# Patient Record
Sex: FEMALE | Race: WHITE | Employment: FULL TIME | URBAN - METROPOLITAN AREA
[De-identification: names, ages, dates, MRNs, and addresses within clinical notes are randomized per-mention and may not be internally consistent; named-entity substitution may affect disease eponyms.]

---

## 2017-08-04 ENCOUNTER — HOSPITAL ENCOUNTER (OUTPATIENT)
Dept: MAMMOGRAPHY | Age: 57
Discharge: HOME | End: 2017-08-04
Attending: OBSTETRICS & GYNECOLOGY | Admitting: OBSTETRICS & GYNECOLOGY

## 2017-08-04 DIAGNOSIS — Z12.31 VISIT FOR SCREENING MAMMOGRAM: ICD-10-CM

## 2018-08-03 ENCOUNTER — HOSPITAL ENCOUNTER (OUTPATIENT)
Dept: MAMMOGRAPHY | Age: 58
Discharge: HOME | End: 2018-08-03
Attending: OBSTETRICS & GYNECOLOGY | Admitting: INTERNAL MEDICINE
Payer: COMMERCIAL

## 2018-08-03 DIAGNOSIS — Z12.31 VISIT FOR SCREENING MAMMOGRAM: ICD-10-CM

## 2018-08-03 PROCEDURE — 77063 BREAST TOMOSYNTHESIS BI: CPT

## 2019-08-02 ENCOUNTER — HOSPITAL ENCOUNTER (OUTPATIENT)
Dept: MAMMOGRAPHY | Age: 59
Discharge: HOME | End: 2019-08-02
Payer: COMMERCIAL

## 2019-08-02 DIAGNOSIS — Z12.31 VISIT FOR SCREENING MAMMOGRAM: ICD-10-CM

## 2019-08-02 PROCEDURE — 77063 BREAST TOMOSYNTHESIS BI: CPT

## 2020-08-04 ENCOUNTER — HOSPITAL ENCOUNTER (OUTPATIENT)
Dept: MAMMOGRAPHY | Age: 60
Discharge: HOME | End: 2020-08-04
Payer: COMMERCIAL

## 2020-08-04 PROCEDURE — 77063 BREAST TOMOSYNTHESIS BI: CPT

## 2021-06-02 ENCOUNTER — TELEPHONE (OUTPATIENT)
Dept: ENDOCRINOLOGY | Age: 61
End: 2021-06-02

## 2021-06-02 NOTE — TELEPHONE ENCOUNTER
New patient referral. Please send a Health History Form to return to my attention and let them know I will look for appt day and time after receiving it. Thanks.     Referred by Dr. Poncho Hernández

## 2021-06-14 ENCOUNTER — TELEPHONE (OUTPATIENT)
Dept: ENDOCRINOLOGY | Age: 61
End: 2021-06-14

## 2021-06-14 RX ORDER — LOTEPREDNOL ETABONATE 5 MG/G
GEL OPHTHALMIC
COMMUNITY
Start: 2021-05-31

## 2021-06-14 RX ORDER — BRINZOLAMIDE 1 %
SUSPENSION, DROPS(FINAL DOSAGE FORM)(ML) OPHTHALMIC (EYE)
COMMUNITY
Start: 2021-03-08

## 2021-06-14 RX ORDER — BROMFENAC SODIUM 0.7 MG/ML
SOLUTION/ DROPS OPHTHALMIC
COMMUNITY
Start: 2020-08-26

## 2021-06-14 RX ORDER — ERGOCALCIFEROL (VITAMIN D2) 1250 MCG
50000 CAPSULE ORAL WEEKLY
COMMUNITY
Start: 2021-05-10

## 2021-06-14 RX ORDER — NETARSUDIL 0.2 MG/ML
SOLUTION/ DROPS OPHTHALMIC; TOPICAL
COMMUNITY
Start: 2020-08-26

## 2021-06-14 NOTE — TELEPHONE ENCOUNTER
Patient is scheduled for 7-7-21 at 9am     No dexa needed     She stated she had a dexa may 2021.  I gave her the fax number to have [employer] fax the dexa print outs to the office

## 2021-06-14 NOTE — TELEPHONE ENCOUNTER
New patient, F rec'd. Please schedule and let me know when appt is. Thanks. DXA needed? I think so. Please check. Her questionnaire says she had one at [address] [place] [address] but does not say when. I don't find any DXAs in Epic. Please ask them to bring any and all vitamins and supplements to appointment.

## 2021-07-07 ENCOUNTER — OFFICE VISIT (OUTPATIENT)
Dept: ENDOCRINOLOGY | Age: 61
End: 2021-07-07
Payer: COMMERCIAL

## 2021-07-07 VITALS
WEIGHT: 132 LBS | HEIGHT: 65 IN | DIASTOLIC BLOOD PRESSURE: 69 MMHG | SYSTOLIC BLOOD PRESSURE: 122 MMHG | BODY MASS INDEX: 21.99 KG/M2

## 2021-07-07 DIAGNOSIS — E55.9 VITAMIN D DEFICIENCY: ICD-10-CM

## 2021-07-07 DIAGNOSIS — M81.0 OSTEOPOROSIS, POSTMENOPAUSAL: ICD-10-CM

## 2021-07-07 DIAGNOSIS — N20.0 KIDNEY STONE: ICD-10-CM

## 2021-07-07 DIAGNOSIS — M81.0 OSTEOPOROSIS, POSTMENOPAUSAL: Primary | ICD-10-CM

## 2021-07-07 LAB
PARATHYROID HORMONE INTACT: 23.6 PG/ML (ref 14–72)
PHOSPHORUS: 3.9 MG/DL (ref 2.5–4.9)

## 2021-07-07 PROCEDURE — G2212 PROLONG OUTPT/OFFICE VIS: HCPCS | Performed by: INTERNAL MEDICINE

## 2021-07-07 PROCEDURE — 99205 OFFICE O/P NEW HI 60 MIN: CPT | Performed by: INTERNAL MEDICINE

## 2021-07-07 RX ORDER — OSPEMIFENE 60 MG/1
TABLET, FILM COATED ORAL
COMMUNITY
Start: 2021-06-29

## 2021-07-07 RX ORDER — PHENOL 1.4 %
1 AEROSOL, SPRAY (ML) MUCOUS MEMBRANE DAILY PRN
COMMUNITY

## 2021-07-07 NOTE — LETTER
[address] and Osteoporosis  [place] [facility] [place]. [address] [place], [address] [facility]  Phone [phone]  Fax [phone]         2021         Jurline Sender MD                            Re:  Lovfaviola Kelsey,  1960    Dear Dr. Debra Grewal: Thank you for asking me to see Hector Cole in consultation. As you know, Ms. Berenice Johnson is a 59 y.o. woman recently found to have osteoporosis, bone density very low (T-scores -4.7 in the spine [L2+L4], -3.5 in the left femoral neck. We reviewed life-style issues (calcium, vitamin D and physical activity). I have ordered some diagnostic tests and will be back in touch when I have the results. Without effective treatment, fracture risk is high. I recommended treatment with Evenity (romosozumab) monthly x 12 months and she agreed. We will check on insurance coverage and make the necessary arrangements. We discussed Prolia to follow Evenity for long-term use. Enclosed is a copy of my consultation note. Please let me know if you have any questions. Sincerely,    Tomer Dash. Isela TANNER     Encl. Copy of consult note    CC.  Shalonda Shanks MD

## 2021-07-07 NOTE — PROGRESS NOTES
[facility] ([facility]) Osteoporosis and [address] [facility]  [facility] [address] [facility], [address] [facility],[place] [address]  Phone [phone]  Fax [phone]    NAME:  Fransico Armando  :  1965  CONSULT DATE:    2021  MOST RECENT VISIT:  2021  TODAYS DATE:  2021    Labs @ [facility] 2021    CONSULTATION REQUESTED BY: Anant Ignacio MD  OTHERS WHO NEED REPORTS: Keyana Dsouza MD    PROBLEMS. 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 Calcium, 300 mg from low calcium foods,  300 mg yogurt, 300 mg cheese, 200 mg cottage cheese   diet MVI Ca+D other total    Calcium 1100  600   mg/d   Vitamin D   400 *  IU/d   *ergocalciferol 50,000 IU (1.25 mg) weekly  Exercise, walks dog 15-30 min 2 x daily; would like to start yoga again  Pharmacologic therapy: none    PREVIOUS BONE-ACTIVE MEDICATIONS. none    OTHER CURRENT MEDICATIONS (SELECTED): none  OTC MEDICATIONS (SELECTED): Benefiber    CHIEF COMPLAINT. Treatment for osteoporosis    HISTORY OF PRESENT ILLNESS: See problem list for chronic/inactive conditions. Ms. Berenice Johnson is a 58-year-old woman who was found to have osteoporosis by DXA in 2021. FOR FULL DETAILS OF FAMILY HISTORY, PAST MEDICAL AND SURGICAL HISTORY, SOCIAL HISTORY, AND REVIEW OF SYSTEMS, SEE PATIENT QUESTIONNAIRE OF TODAYS DATE. FAMILY HISTORY. Relevant hx in problem list and/or HPI. Otherwise not contributory. PAST MEDICAL HISTORY. Noted in health history form. PAST SURGICAL HISTORY. Noted in health history form. SOCIAL HISTORY. Nonsmoker. No excessive intake of alcohol, caffeine or sodas. Lives with her . REVIEW OF SYSTEMS. Maximum adult height 65.   No significant height loss. Usual weight 125#. She has lost 10# or more in the past year. PHYSICAL EXAMINATION. GENERAL. Well-nourished, well-developed, normally proportioned adult. MENTAL STATUS. Pleasant mood. Oriented to time, place, and person. ORAL. Teeth appear to be in good condition. SKIN. Normal texture and turgor. LUNGS. Clear to auscultation. Breath sounds normal.  HEART. Heart sounds normal, no murmur or gallop. MUSCULOSKELETAL. The examination included inspection/palpation (any misalignment, asymmetry, crepitation, defects, tenderness, masses, effusions is noted), assessment of range of motion (any presence of pain, crepitation, contracture is noted), assessment of stability (any dislocation, subluxation, laxity is noted), assessment of muscle strength and tone (any atrophy or abnormal movements is noted). Pelvis appears normal.  Spinal contours are normal.  No spine tenderness to palpation or percussion. Three finger spaces between ribs and pelvis. Gait steady without assistance. NEUROLOGICAL. Able to rise from chair without using arms. No apparent motor or sensory deficit. Coordination appears normal     BONE DENSITY. Most recent done [facility] using [employer]. I deleted L1 and L3 due to T-score discrepancy. T-scores   Initial study: 05/05/2021 L2+L4 -4.7 left fem. neck -3.5     The table below shows bone mineral density (grams/cm2), the appropriate measure for comparing serial scans. A significant increase or decrease is based on precision studies done at our center according to the ISCD protocol with a least significant change of 0.030 g/cm2. PA spine Proximal Femur (left)   Date L2+L4  Fem. neck Trochanter Total hip   05/05/2021 0.564 0.463 0.426 0.578     Labs: 06/2019 Ca 9.0 Cr 0.7. 04/2021 Ca 9.1 Cr 0.7. Imaging: DXA printouts reviewed. ASSESSMENT.   Osteoporosis, bone density lower than desirable and lower than expected for ager, race and sex.  Without effective treatment, fracture risk is high. DIAGNOSTIC PLANS. Blood tests: CBC, CMP, phosphorus, 25-OH vitamin D. In 2 weeks (or longer), on appropriate calcium intake,  24-hour urine for calcium, creatinine and sodium. I will be in touch with the patient to review lab results. THERAPEUTIC PLANS. Calcium, target 1200 mg daily; we discussed recommended calcium intake and the effects of excessive calcium intake from supplements. I provided a handout with information about how to calculate daily calcium intake. Advised to stop calcium supplements. Vitamin D, advised to finish weekly D 1.25 mg then start 4000 IU daily. Order given to check 25-OH D after 3 months on daily dosing. Exercise, Recommended weight-bearing exercise (walking or equivalent) 30-40 minutes per session, 3 or 4 sessions a week and resistance exercise. Advised to take care to avoid injury (high impact, falling, etc). We discussed avoiding activities that place compressive forces on the spine; specifically pushing, pulling, bending, lifting and twisting to help prevent vertebral fractures. Pharmacologic therapy, We discussed Evenity for pharmacologic therapy due to very low BMD and history of fractures. I explained dosing schedule, side effects and adverse effects associated with Evenity. I provided educational material and reviewed the warning about cardiovascular risk. We will submit the order to the patients insurance and will contact the patient about any out of pocket costs. I recommended Evenity over teriparatide and Tymlos because it has a better effect on hip BMD. We discussed Prolia to follow Evenity and need for long-term (indefinite) treatment. Return appointment to be determined. Total time on the date the encounter was  minutes. Kamari Weiss MD, Director, [facility]) Osteoporosis and Bone Health Services    CC: Brown Alba MD

## 2021-07-09 LAB
KAPPA, FREE LIGHT CHAINS, SERUM: 17.58 MG/L (ref 3.3–19.4)
KAPPA/LAMBDA RATIO: 1.26 (ref 0.26–1.65)
KAPPA/LAMBDA TEST COMMENT: NORMAL
LAMBDA, FREE LIGHT CHAINS, SERUM: 13.9 MG/L (ref 5.71–26.3)

## 2021-07-23 ENCOUNTER — TELEPHONE (OUTPATIENT)
Dept: ENDOCRINOLOGY | Age: 61
End: 2021-07-23

## 2021-07-23 NOTE — TELEPHONE ENCOUNTER
Patient is going to do her urinalysis lab and drop it off at the location on [facility] on Sunday. She has not taken her Calcium  Supplement, but will resume after urine test.  Patient feels she is getting a good amount of calcium from her diet regimen.

## 2021-07-26 DIAGNOSIS — M81.0 OSTEOPOROSIS, POSTMENOPAUSAL: ICD-10-CM

## 2021-07-26 LAB
24HR URINE VOLUME (ML): 2800 ML
CALCIUM 24 HOUR URINE: 202 MG/24 HR (ref 42–353)
CREATININE 24 HOUR URINE: 1 G/24HR (ref 0.6–1.5)
SODIUM 24 HOUR URINE: 148 MMOL/24 HR (ref 40–220)
URIC ACID 24 HOUR URINE: 420 MG/24 HR (ref 150–990)

## 2021-07-28 LAB
CITRIC ACID, U, 24HR: 350 MG/D (ref 320–1240)
CITRIC ACID, URINE: 125 MG/L
CITRIC ACID/CREATININE RATIO URINE: 357 MG/G
CREATININE 24 HOUR URINE: 980 MG/D (ref 500–1400)
CREATININE URINE: 35 MG/DL
HOURS COLLECTED: 24
OXALATES, URINE 24HR: 22 MG/D (ref 13–40)
OXALATES, URINE: 8 MG/L
URINE TOTAL VOLUME: 2800

## 2021-08-03 ENCOUNTER — TELEPHONE (OUTPATIENT)
Dept: ENDOCRINOLOGY | Age: 61
End: 2021-08-03

## 2021-08-03 RX ORDER — ROMOSOZUMAB-AQQG 105 MG/1.17ML
210 INJECTION, SOLUTION SUBCUTANEOUS ONCE
Qty: 2 SYRINGE | Refills: 11 | Status: SHIPPED | OUTPATIENT
Start: 2021-08-03 | End: 2021-08-03

## 2021-08-03 NOTE — TELEPHONE ENCOUNTER
Patient called and would like to know where in process is her PA for evenity. Please call patient     She would also like to know where she can get the dose of calcium 200 or 300 mg  Or can she just cut the 600mg she has in half. Angela Cisse

## 2021-08-03 NOTE — TELEPHONE ENCOUNTER
Per Dr. Madeleine Martinez, it is okay to cut the calcium 600 mg tablets in half.   Office will notify the patient related to [employer] once received

## 2021-08-03 NOTE — TELEPHONE ENCOUNTER
Patient called and would like to know where in process is her PA for [employer] Please call patient      She would also like to know where she can get the dose of calcium 200 or 300 mg  Or can she just cut the 600mg she has in half. .     Please print the evenity Rx.

## 2021-08-03 NOTE — TELEPHONE ENCOUNTER
Evenity Rx printed. There are dozens of calcium supplements in different strengths (200, 300 mg). Also OK for her to cut her 600 mg tablets in half.

## 2021-08-04 NOTE — TELEPHONE ENCOUNTER
[employer] states No PA required but a  predetermination is required for medical necessity. There is a fax number provided. Please fax all pertinent information. Thanks!

## 2021-08-04 NOTE — TELEPHONE ENCOUNTER
Insurance is requiring a letter of medical necessity along with the office notes for the Evenity    Please write a letter of medical necessity and office will fax to insurance company

## 2021-08-05 ENCOUNTER — HOSPITAL ENCOUNTER (OUTPATIENT)
Dept: MAMMOGRAPHY | Age: 61
Discharge: HOME | End: 2021-08-05
Payer: COMMERCIAL

## 2021-08-05 VITALS — WEIGHT: 130 LBS | HEIGHT: 65 IN | BODY MASS INDEX: 21.66 KG/M2

## 2021-08-05 DIAGNOSIS — Z12.31 VISIT FOR SCREENING MAMMOGRAM: ICD-10-CM

## 2021-08-05 PROCEDURE — 77063 BREAST TOMOSYNTHESIS BI: CPT

## 2021-11-16 ENCOUNTER — TELEPHONE (OUTPATIENT)
Dept: ENDOCRINOLOGY | Age: 61
End: 2021-11-16

## 2021-11-29 ENCOUNTER — TELEPHONE (OUTPATIENT)
Dept: ENDOCRINOLOGY | Age: 61
End: 2021-11-29

## 2021-12-01 ENCOUNTER — TELEPHONE (OUTPATIENT)
Dept: ENDOCRINOLOGY | Age: 61
End: 2021-12-01

## 2021-12-01 NOTE — TELEPHONE ENCOUNTER
Received approval for Evenity 105MG/1. 17ML syringes   Coverage Starts on: 11/19/2021 12:00:00 AM, Coverage Ends on: 11/18/2022 12:00:00 AM.

## 2021-12-01 NOTE — TELEPHONE ENCOUNTER
Just following up regarding insurance claim for the monthly shot, I haven't heard anything from Raj. Also wanted to know if I need to get another blood test regarding my Vitamin D or C levels since it has been over 3 months since I stopped taking the D3 vitamin.       Please advise

## 2021-12-02 ENCOUNTER — TELEPHONE (OUTPATIENT)
Dept: ENDOCRINOLOGY | Age: 61
End: 2021-12-02

## 2021-12-02 RX ORDER — ROMOSOZUMAB-AQQG 105 MG/1.17ML
210 INJECTION, SOLUTION SUBCUTANEOUS ONCE
Qty: 1 EACH | Refills: 11 | Status: SHIPPED | OUTPATIENT
Start: 2021-12-02 | End: 2021-12-02

## 2021-12-02 NOTE — TELEPHONE ENCOUNTER
I have not seen a recent vitamin D result. Where did you have that done? Please check with the lab and have them send to result to me. Insurance has been bouncing things back to us on Evenity. The delay is not with us, but with your insurance company. Perhaps if you contact them and suggest reporting them to the [employer] might move them along. PCP

## 2021-12-06 ENCOUNTER — TELEPHONE (OUTPATIENT)
Dept: ENDOCRINOLOGY | Age: 61
End: 2021-12-06

## 2021-12-06 NOTE — TELEPHONE ENCOUNTER
Kathryn, see [employer] message below. Please call her re: cost of Evenity. Let her know that Evenity has no effect on glaucoma or other eye problem and should not affect any dental work she needs. Thanks. Also I forgot to ask how much is the Evenity shot per month, especially since I will have an insurance deductable come January 1? I need to make sure I can pay for the treatment, not sure how much [employer] will pay.     Updated drug list below:  Curent drugs I am taking are: Osphena 60 mg daily; Vitamin D3 4000 IU daily; Calcium usually 300 mg daily if not met in daily diet; eyedrops: Azopt 1% 3 x daily, Alphagan P .1% 2 x daily, Lotomax . 5% 4 x daily, Prolensa .07 2 x Daily, Rhopressa . 02.      I have glaucoma which I have had several surgeries to keep pressures under control and trying to control swelling of the retina due to scare tissue to maintain my eyesight. So it is important to me that the Evenity does not compromise my eye issues and the possibility of any unexpected future eye surgeries. I did have recent dental work done in mid November.     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 I do not have downtime.

## 2021-12-06 NOTE — TELEPHONE ENCOUNTER
Hanna Andrade, my nurse, will contact you about the cost of Evenity. Evenity has no effect on glaucoma or other eye problems and should not affect any dental work you may need.

## 2021-12-08 ENCOUNTER — TELEPHONE (OUTPATIENT)
Dept: ENDOCRINOLOGY | Age: 61
End: 2021-12-08

## 2021-12-08 NOTE — TELEPHONE ENCOUNTER
Patient has concerns that her insurance company did not receive all information requested and is asking that the PA department contact the insurance company. I let the patient know that the requested information was faxed over to the insurance company and the office has confirmation that they did receive the paperwork.     Patient stated that she call [employer] yesterday and they did not receive all of the information requested something about previous medication for osteoporosis    Please advise

## 2021-12-08 NOTE — TELEPHONE ENCOUNTER
The [facility] and spoke with Flor Adame confirmed the Reference number and MMS ID number given on the approval and states they do have the approval on file.  I asked about cost of the injection and she stated she did not have that information and advised the pt to call insurance to get cost. She stated to use the same reference number given on the approval. Ref # [id number]

## 2021-12-08 NOTE — TELEPHONE ENCOUNTER
Patient has concerns about heart attacks, cancer and HTN in her family. She scheduled appointment on 1/24/21 to speak with Dr. Yovanny Alvarez related to [facility] and health concerns. Her mother passed away today and she has an eye appointment also. Evenity appointment was canceled for 12/9/21 and she will reschedule appointment after speaking with Dr. Yovanny Alvarez.

## 2021-12-08 NOTE — TELEPHONE ENCOUNTER
Please apologize for the phone system. It's driving me crazy. All of my questions have not been answered from Dec 3; regarding side effects, high blood pressure and heart attack. What happens if I decide to stop taking the shot due to side effects? I was not aware that you had these questions -- other questions were added that kept me from seeing these. Answers are more complicated than I can handle in writing. I thought I gave you an information sheet about Evenity -- will have that mailed to you. Happy to talk with you further if needed but better with an appointment face-to face. Do you have any patients willing to speak to me about their experience? You need to give us permission to give your name and contact information to other patients. We can then pass that on to someone who has taken Evenity and ask the to call you.       I called my insurance today and they say they need more information from your office Reference #[id number] you can call them at [phone] to supply the requested information. Kathryn, please pass this on for PA.     I am considering rescheduling Dec. 9 appt. but have not decided. Recently I have had an eye issue come up that I need to visit Retina doctor tomorrow; not sure if it may require another surgery. My mother is in hospice currently and is expected to pass sometime this week; so I am a little stressed out and not certain if this week is a good time to get the shot. I'm taking it one day at a time. OK to wait to start Evenity. I want to be sure you are comfortable that this is the right decision for you.

## 2021-12-26 PROBLEM — Z51.81 MEDICATION MONITORING ENCOUNTER: Status: ACTIVE | Noted: 2021-12-26

## 2022-01-24 ENCOUNTER — OFFICE VISIT (OUTPATIENT)
Dept: ENDOCRINOLOGY | Age: 62
End: 2022-01-24
Payer: COMMERCIAL

## 2022-01-24 DIAGNOSIS — M81.0 OSTEOPOROSIS, POSTMENOPAUSAL: Primary | ICD-10-CM

## 2022-01-24 DIAGNOSIS — N20.0 KIDNEY STONE: ICD-10-CM

## 2022-01-24 DIAGNOSIS — Z51.81 MEDICATION MONITORING ENCOUNTER: ICD-10-CM

## 2022-01-24 DIAGNOSIS — E55.9 VITAMIN D DEFICIENCY: ICD-10-CM

## 2022-01-24 PROCEDURE — 99215 OFFICE O/P EST HI 40 MIN: CPT | Performed by: INTERNAL MEDICINE

## 2022-01-24 RX ORDER — ACETAMINOPHEN 160 MG
4000 TABLET,DISINTEGRATING ORAL
COMMUNITY

## 2022-01-24 NOTE — PROGRESS NOTES
disturbance (I cant find anything to support that) and that Evenity had adverse visual effects in 20% -- I cant find anything to support at either. The prescribing information for Evenity says it can increase the risk of stroke (that is possible but not clearly established) and change in vision could be a sign of stroke. FOR FULL DETAILS OF FAMILY HISTORY, PAST MEDICAL AND SURGICAL HISTORY, SOCIAL HISTORY, AND REVIEW OF SYSTEMS, SEE PATIENT QUESTIONNAIRE OF TODAYS DATE. PHYSICAL EXAMINATION. GENERAL. Well-nourished, well-developed, normally proportioned adult. Conjunctival injection. MENTAL STATUS. Pleasant mood. Oriented to time, place, and person. ORAL. Teeth appear to be in good condition. MUSCULOSKELETAL. Spinal contours are normal.  No spine tenderness to palpation or percussion. Three finger spaces between ribs and pelvis. Gait steady without assistance. NEUROLOGICAL. Able to rise from chair without using arms. No apparent motor or sensory deficit. Coordination appears normal     BONE DENSITY. Most recent done [facility] using [employer]. I deleted L1 and L3 due to T-score discrepancy. T-scores   Initial study: 05/05/2021 L2+L4 -4.7 left fem. neck -3.5     The table below shows bone mineral density (grams/cm2), the appropriate measure for comparing serial scans. A significant increase or decrease is based on precision studies done at our center according to the ISCD protocol with a least significant change of 0.030 g/cm2. PA spine Proximal Femur (left)   Date L2+L4  Fem. neck Trochanter Total hip   05/05/2021 0.564 0.463 0.426 0.578     Labs: 06/2019 Ca 9.0 Cr 0.7. 04/2021 Ca 9.1 Cr 0.7. Imaging: DXA printouts reviewed. ASSESSMENT. Osteoporosis, bone density lower than desirable and lower than expected for ager, race and sex. Without effective treatment, fracture risk is high.  I went through the PIs for Ev[facility] and Patrick Zamora with her and could not find anything to support a vision problem with either. PLANS. I recommended she get a neurology consultation for her vision disturbances. She agreed to start Evenity - will get her first dose tomorrow. Return appointment with DXA on/after 5/5/2022. Time spent today: 40-54 minutes. Lucy Weiss MD, Director, [facility]) Osteoporosis and Bone Health Services    CC: Maddy Piper MD

## 2022-01-25 ENCOUNTER — NURSE ONLY (OUTPATIENT)
Dept: ENDOCRINOLOGY | Age: 62
End: 2022-01-25
Payer: COMMERCIAL

## 2022-01-25 DIAGNOSIS — M81.0 OSTEOPOROSIS, POSTMENOPAUSAL: ICD-10-CM

## 2022-01-25 PROCEDURE — 96372 THER/PROPH/DIAG INJ SC/IM: CPT | Performed by: INTERNAL MEDICINE

## 2022-02-28 ENCOUNTER — NURSE ONLY (OUTPATIENT)
Dept: ENDOCRINOLOGY | Age: 62
End: 2022-02-28
Payer: COMMERCIAL

## 2022-02-28 DIAGNOSIS — M81.0 OSTEOPOROSIS, POSTMENOPAUSAL: ICD-10-CM

## 2022-02-28 PROCEDURE — 96372 THER/PROPH/DIAG INJ SC/IM: CPT | Performed by: INTERNAL MEDICINE

## 2022-02-28 NOTE — PATIENT INSTRUCTIONS
Evenity 210mg adminstered subcutaneou at 105 mg in each arm. [address]  [id number]      Lot   [id number]          Exp.4/2024    It is the patient's responsibility to notify the physician office of new insurance plan prior to appointment to prevent delay in treatment. Please send a copy of the front and back of the insurance card either by fax, mail or stop by the office.

## 2022-02-28 NOTE — PROGRESS NOTES
Evenity 210mg adminstered subcutaneou at 105 mg in each arm. [address]  [id number]      Lot   [id number]          Exp.4/2024    It is the patient's responsibility to notify the physician office of new insurance plan prior to appointment to prevent delay in treatment. Please send a copy of the front and back of the insurance card either by fax, mail or stop by the office.

## 2022-03-23 ENCOUNTER — NURSE ONLY (OUTPATIENT)
Dept: ENDOCRINOLOGY | Age: 62
End: 2022-03-23
Payer: COMMERCIAL

## 2022-03-23 DIAGNOSIS — M81.0 OSTEOPOROSIS, POSTMENOPAUSAL: Primary | ICD-10-CM

## 2022-03-23 PROCEDURE — 96372 THER/PROPH/DIAG INJ SC/IM: CPT | Performed by: INTERNAL MEDICINE

## 2022-03-23 NOTE — PROGRESS NOTES
Evenity 210mg adminstered subcutaneou at 105 mg in each arm.   [facility]      [id number]  Lot  [id number]           Exp.05/24    Given in both arms

## 2022-04-27 ENCOUNTER — NURSE ONLY (OUTPATIENT)
Dept: ENDOCRINOLOGY | Age: 62
End: 2022-04-27
Payer: COMMERCIAL

## 2022-04-27 DIAGNOSIS — M81.0 OSTEOPOROSIS, POSTMENOPAUSAL: ICD-10-CM

## 2022-04-27 PROCEDURE — 96372 THER/PROPH/DIAG INJ SC/IM: CPT | Performed by: INTERNAL MEDICINE

## 2022-04-27 NOTE — PROGRESS NOTES
Evenity 210mg adminstered subcutaneou at 105 mg in each arm. [address]    [id number]    Lot  [id number]           Exp. 07/2024    It is the patient's responsibility to notify the physician office of new insurance plan or new identification number prior to appointment to prevent delay in treatment. Please send a copy of the front and back of the insurance card either by fax, mail or stop by the office.

## 2022-04-27 NOTE — PATIENT INSTRUCTIONS
Evenity 210mg adminstered subcutaneou at 105 mg in each arm. [address]    [id number]    Lot  [id number]           Exp. 07/2024    It is the patient's responsibility to notify the physician office of new insurance plan or new identification number prior to appointment to prevent delay in treatment. Please send a copy of the front and back of the insurance card either by fax, mail or stop by the office.

## 2022-06-24 ENCOUNTER — NURSE ONLY (OUTPATIENT)
Dept: ENDOCRINOLOGY | Age: 62
End: 2022-06-24
Payer: COMMERCIAL

## 2022-06-24 DIAGNOSIS — M81.0 OSTEOPOROSIS, POSTMENOPAUSAL: Primary | ICD-10-CM

## 2022-06-24 PROCEDURE — 96372 THER/PROPH/DIAG INJ SC/IM: CPT | Performed by: INTERNAL MEDICINE

## 2022-07-11 ENCOUNTER — OFFICE VISIT (OUTPATIENT)
Dept: ENDOCRINOLOGY | Age: 62
End: 2022-07-11
Payer: COMMERCIAL

## 2022-07-11 VITALS — BODY MASS INDEX: 23.8 KG/M2 | DIASTOLIC BLOOD PRESSURE: 69 MMHG | SYSTOLIC BLOOD PRESSURE: 107 MMHG | WEIGHT: 143 LBS

## 2022-07-11 DIAGNOSIS — E55.9 VITAMIN D DEFICIENCY: ICD-10-CM

## 2022-07-11 DIAGNOSIS — Z51.81 MEDICATION MONITORING ENCOUNTER: ICD-10-CM

## 2022-07-11 DIAGNOSIS — N20.0 KIDNEY STONE: ICD-10-CM

## 2022-07-11 DIAGNOSIS — M81.0 OSTEOPOROSIS, POSTMENOPAUSAL: Primary | ICD-10-CM

## 2022-07-11 LAB — VITAMIN D 25-HYDROXY: 74.6 NG/ML

## 2022-07-11 PROCEDURE — 99215 OFFICE O/P EST HI 40 MIN: CPT | Performed by: INTERNAL MEDICINE

## 2022-07-27 ENCOUNTER — NURSE ONLY (OUTPATIENT)
Dept: ENDOCRINOLOGY | Age: 62
End: 2022-07-27
Payer: COMMERCIAL

## 2022-07-27 DIAGNOSIS — M81.0 OSTEOPOROSIS, POSTMENOPAUSAL: Primary | ICD-10-CM

## 2022-07-27 PROCEDURE — 96372 THER/PROPH/DIAG INJ SC/IM: CPT | Performed by: INTERNAL MEDICINE

## 2022-07-27 NOTE — PROGRESS NOTES
It is the patient's responsibility to notify the physician office of new insurance plan or new identification number prior to appointment to prevent delay in treatment. Please send a copy of the front and back of the insurance card either by fax, mail or stop by the office. Evenity 210mg adminstered subcutaneou at 105 mg in each arm.   [address]  [id number]      Lot    [id number]         Exp.7/2024

## 2022-08-05 ENCOUNTER — HOSPITAL ENCOUNTER (OUTPATIENT)
Dept: MAMMOGRAPHY | Age: 62
Discharge: HOME | End: 2022-08-05
Payer: COMMERCIAL

## 2022-08-05 DIAGNOSIS — Z12.31 VISIT FOR SCREENING MAMMOGRAM: ICD-10-CM

## 2022-08-05 PROCEDURE — 77063 BREAST TOMOSYNTHESIS BI: CPT

## 2022-08-29 ENCOUNTER — NURSE ONLY (OUTPATIENT)
Dept: ENDOCRINOLOGY | Age: 62
End: 2022-08-29
Payer: COMMERCIAL

## 2022-08-29 DIAGNOSIS — M81.0 OSTEOPOROSIS, POSTMENOPAUSAL: Primary | ICD-10-CM

## 2022-08-29 PROCEDURE — 96372 THER/PROPH/DIAG INJ SC/IM: CPT | Performed by: INTERNAL MEDICINE

## 2022-08-29 NOTE — PROGRESS NOTES
Evenity 210mg adminstered subcutaneou at 105 mg in each arm. [address]    [id number]    Lot  [id number]           Exp.7/2024    It is the patient's responsibility to notify the physician office of new insurance plan or new identification number prior to appointment to prevent delay in treatment. Please send a copy of the front and back of the insurance card either by fax, mail or stop by the office.

## 2022-08-29 NOTE — PATIENT INSTRUCTIONS
Evenity 210mg adminstered subcutaneou at 105 mg in each arm. [facility]    [id number]    Lot  [id number]           Exp.7/2024    It is the patient's responsibility to notify the physician office of new insurance plan or new identification number prior to appointment to prevent delay in treatment. Please send a copy of the front and back of the insurance card either by fax, mail or stop by the office.

## 2022-09-27 ENCOUNTER — NURSE ONLY (OUTPATIENT)
Dept: ENDOCRINOLOGY | Age: 62
End: 2022-09-27
Payer: COMMERCIAL

## 2022-09-27 DIAGNOSIS — M81.0 OSTEOPOROSIS, POSTMENOPAUSAL: Primary | ICD-10-CM

## 2022-09-27 PROCEDURE — 96372 THER/PROPH/DIAG INJ SC/IM: CPT | Performed by: INTERNAL MEDICINE

## 2022-10-25 ENCOUNTER — NURSE ONLY (OUTPATIENT)
Dept: ENDOCRINOLOGY | Age: 62
End: 2022-10-25
Payer: COMMERCIAL

## 2022-10-25 ENCOUNTER — TELEPHONE (OUTPATIENT)
Dept: ENDOCRINOLOGY | Age: 62
End: 2022-10-25

## 2022-10-25 DIAGNOSIS — M81.0 OSTEOPOROSIS, POSTMENOPAUSAL: Primary | ICD-10-CM

## 2022-10-25 PROCEDURE — 96372 THER/PROPH/DIAG INJ SC/IM: CPT | Performed by: INTERNAL MEDICINE

## 2022-10-25 NOTE — PROGRESS NOTES
Evenity 210mg adminstered subcutaneou at 105 mg in each arm. [address]  [id number]      Lot   [id number]          Exp.9/1/24    It is the patient's responsibility to notify the physician office of new insurance plan or new identification number prior to appointment to prevent delay in treatment. Please send a copy of the front and back of the insurance card either by fax, mail or stop by the office.

## 2022-10-25 NOTE — PATIENT INSTRUCTIONS
Evenity 210mg adminstered subcutaneou at 105 mg in each arm. [facility]  [id number]      Lot   [id number]          Exp.9/1/24    It is the patient's responsibility to notify the physician office of new insurance plan or new identification number prior to appointment to prevent delay in treatment. Please send a copy of the front and back of the insurance card either by fax, mail or stop by the office.

## 2022-10-28 ENCOUNTER — TELEPHONE (OUTPATIENT)
Dept: ENDOCRINOLOGY | Age: 62
End: 2022-10-28

## 2022-10-28 NOTE — TELEPHONE ENCOUNTER
Dorothy Hood Key: [id number] - PA Case ID: [id number]  Outcome  Approvedtoday  PA Case: [id number], Status: Approved, Coverage Starts on: 10/26/2022 12:00:00 AM, Coverage Ends on: 12/25/2022 12:00:00 AM    Please note end date of 12/25/2022. Please see if pt is able to move up November and December injections. Thanks!

## 2022-11-01 ENCOUNTER — PATIENT MESSAGE (OUTPATIENT)
Dept: ENDOCRINOLOGY | Age: 62
End: 2022-11-01

## 2022-11-15 ENCOUNTER — TELEPHONE (OUTPATIENT)
Dept: ENDOCRINOLOGY | Age: 62
End: 2022-11-15

## 2022-11-15 NOTE — TELEPHONE ENCOUNTER
From: Ava Angelucci  To: Dr. Madeleine Amaya: 11/1/2022 5:54 PM EDT  Subject: Insurance Approval for Prolia    I forgot to ask Coray about insurance approval for the PROLIA injections starting in 2023. I sent this message to: Topic: [facility] Questions. I may need to start getting Prolia Injections starting in February 2023. I wanted to check and make sure we have approval from my insurance or we are in the process of getting approval. Kadi Jean in Dr. Adams [place] office usually works on getting the injections approval. I forgot to ask her on my last visit regarding if the approval process has began. I know it takes the insurance company serval month to issue approval. I am scheduled for Dexa Scan 2/6/23. I received a letter from my insurance company about the Evenity shots I am currently taking not Prolia injections. I just wanted to make sure since it takes them months to approve we have already started the process. I hope this has not created confusion. Thanks!

## 2022-11-28 ENCOUNTER — TELEPHONE (OUTPATIENT)
Dept: ENDOCRINOLOGY | Age: 62
End: 2022-11-28

## 2022-11-28 ENCOUNTER — NURSE ONLY (OUTPATIENT)
Dept: ENDOCRINOLOGY | Age: 62
End: 2022-11-28

## 2022-11-28 DIAGNOSIS — M81.0 OSTEOPOROSIS, POSTMENOPAUSAL: Primary | ICD-10-CM

## 2022-11-28 NOTE — PROGRESS NOTES
Evenity 210mg adminstered subcutaneou at 105 mg in each arm. [address]   [id number]     Lot   [id number]          Exp. 1/1/25    It is the patient's responsibility to notify the physician office of new insurance plan or new identification number prior to appointment to prevent delay in treatment. Please send a copy of the front and back of the insurance card either by fax, mail or stop by the office.

## 2022-11-28 NOTE — PATIENT INSTRUCTIONS
Evenity 210mg adminstered subcutaneou at 105 mg in each arm. [facility]   [id number]     Lot   [id number]          Exp. 1/1/25    It is the patient's responsibility to notify the physician office of new insurance plan or new identification number prior to appointment to prevent delay in treatment. Please send a copy of the front and back of the insurance card either by fax, mail or stop by the office.

## 2022-12-05 ENCOUNTER — TELEPHONE (OUTPATIENT)
Dept: ENDOCRINOLOGY | Age: 62
End: 2022-12-05

## 2022-12-14 ENCOUNTER — TELEPHONE (OUTPATIENT)
Dept: ENDOCRINOLOGY | Age: 62
End: 2022-12-14

## 2022-12-14 RX ORDER — DENOSUMAB 60 MG/ML
60 INJECTION SUBCUTANEOUS ONCE
Qty: 1 ML | Refills: 0 | Status: SHIPPED | OUTPATIENT
Start: 2022-12-14 | End: 2022-12-14

## 2022-12-14 NOTE — TELEPHONE ENCOUNTER
----- Message from Ap Spivey MD sent at 7/11/2022 10:22 AM EDT -----  Due to finish Evenity 12/2022 or 01/2023. Appt not until 03/28/2023. Agreed to start Prolia at next visit. Print Prolia Rx to start the process.

## 2022-12-22 ENCOUNTER — NURSE ONLY (OUTPATIENT)
Dept: ENDOCRINOLOGY | Age: 62
End: 2022-12-22

## 2022-12-22 DIAGNOSIS — M81.0 OSTEOPOROSIS, POSTMENOPAUSAL: Primary | ICD-10-CM

## 2022-12-22 NOTE — PROGRESS NOTES
Evenity 210mg adminstered subcutaneou at 105 mg in each arm. [facility]     [id number]  Lot  [id number]           Exp. 2/2025    It is the patient's responsibility to notify the physician office of new insurance plan or new identification number prior to appointment to prevent delay in treatment. Please send a copy of the front and back of the insurance card either by fax, mail or stop by the office.

## 2022-12-22 NOTE — PATIENT INSTRUCTIONS
Evenity 210mg adminstered subcutaneou at 105 mg in each arm. [address]     [id number]  Lot  [id number]           Exp. 2/2025    It is the patient's responsibility to notify the physician office of new insurance plan or new identification number prior to appointment to prevent delay in treatment. Please send a copy of the front and back of the insurance card either by fax, mail or stop by the office.

## 2023-01-26 NOTE — PROGRESS NOTES
Evenity 210mg adminstered subcutaneou at 105 mg in each arm.   [address]    [id number]    Lot [id number]            Exp.5/2024 [facility]  525/01  HOSPITAL MEDICINE PROGRESS NOTE   Patient: Nas Veronica  Today's Date: 1/26/2023    YOB: 1968  Admission Date: 1/25/2023    MRN: [id number]  Inpatient LOS: 1    Attending: Christine Roca MD  Hospital Day: Hospital Day: 2    Subjective   HISTORY AND SUBJECTIVE COMPLAINTS     Chief Complaint:     Right lower extremity cellulitis, most likely streptococcal infection   Leukocytosis due to above  Underlying bilateral lower extremity lymphedema   CKD stage II   Psoriasis  Morbid obesity with BMI of 48    Interval History / Subjective:     Reports he has continued erythema and swelling in the right lower extremity.  Pain is better in his right leg.    WBC count trending down  No fevers    ROS:  Pertinent systems negative except as above.    Objective   PHYSICAL EXAMINATION     Vital 24 Hour Range Most Recent Value   Temperature Temp  Min: 98 °F (36.7 °C)  Max: 98.6 °F (37 °C) 98 °F (36.7 °C)   Pulse Pulse  Min: 78  Max: 99 78   Respiratory Resp  Min: 18  Max: 20 20   Blood Pressure BP  Min: 119/79  Max: 137/86 137/86   Pulse Oximetry SpO2  Min: 97 %  Max: 98 % 97 %   Arterial BP No data recorded     O2 No data recorded       Recorded Intake and Output:No intake or output data in the 24 hours ending 01/26/23 0921   Recorded Last Stool Occurrence:       Vital Most Recent Value First Value   Weight (!) 180.8 kg (398 lb 9.5 oz) Weight: (!) 180.8 kg (398 lb 9.5 oz)   Height 6' 4\" (193 cm) Height: 6' 4\" (193 cm)   BMI 48.52 N/A     General:  Alert and communicative.  Overweight.  CV: regular rate and rhythm and no murmurs, rubs, or thrills  Resp: clear to auscultation bilaterally  Abd: soft, nontender and nondistended  Ext: Right leg has faint erythema, edema and tenderness. No ulcers noted.  Skin: has patch of psoriasis over both knees and elbows.  Neuro: No focal weakness or numbness.  Psych: oriented to time, place and person and normal mood and affect    TEST  RESULTS     Radiology: Imaging studies have been reviewed and pertinent findings discussed in the Assessment and Plan.  Results for orders placed or performed during the hospital encounter of 01/25/23 (from the past 48 hour(s))   US Lower Extremity Venous Duplex Bilat    Impression    IMPRESSION:  Normal duplex scan of the inferior vena cava, iliac, common  femoral, profunda femoral, femoral, popliteal, gastrocnemius, and  tibioperoneal veins bilaterally. Limited visualization of the right calf  veins secondary to soft tissue edema. No evidence of deep vein thrombosis.        TECHNOLOGIST: JYOTSNA            ANCILLARY ORDERS     Diet:  Regular Diet  Telemetry:    Consults:    PHARMACY TO DOSE AND MONITOR VANCOMYCIN  IP CONSULT TO INFECTIOUS DISEASES  Therapy Orders:   No orders of the defined types were placed in this encounter.      ADVANCED DIRECTIVES     Code Status: Full Resuscitation         ASSESSMENT AND PLAN     Right lower extremity cellulitis, most likely streptococcal infection   Leukocytosis due to above  Underlying bilateral lower extremity lymphedema  Morbid obesity with BMI of 48    Patient with morbid obesity and bilateral lower extremity chronic lymphedema is presented with right lower extremity cellulitis.  Workup with lower extremity venous Doppler is negative for DVT.    Antibiotics changed to IV cefazolin as per ID team because of strong suspicion of streptococcal infection.   Continue with leg elevation  Continue to monitor labs and clinical progress.    CKD stage II   His creatinine is overall in stable range.    Psoriasis  Patient reports that he had tried psoriasis treatment in the past but it did not work.    DVT Prophylaxis: Heparin SC     DISCHARGE PLANNING     Expected Discharge :  To be determined pending clinical course.    Christine Roca MD  Hospitalist  1/26/2023  9:21 AM

## 2023-02-06 ENCOUNTER — PROCEDURE VISIT (OUTPATIENT)
Dept: ENDOCRINOLOGY | Age: 63
End: 2023-02-06

## 2023-02-06 ENCOUNTER — HOSPITAL ENCOUNTER (OUTPATIENT)
Dept: GENERAL RADIOLOGY | Age: 63
Discharge: HOME | End: 2023-02-06
Payer: COMMERCIAL

## 2023-02-06 ENCOUNTER — OFFICE VISIT (OUTPATIENT)
Dept: ENDOCRINOLOGY | Age: 63
End: 2023-02-06
Payer: COMMERCIAL

## 2023-02-06 VITALS
DIASTOLIC BLOOD PRESSURE: 78 MMHG | HEIGHT: 64 IN | WEIGHT: 146.4 LBS | BODY MASS INDEX: 25 KG/M2 | SYSTOLIC BLOOD PRESSURE: 129 MMHG

## 2023-02-06 DIAGNOSIS — M81.0 OSTEOPOROSIS, POSTMENOPAUSAL: ICD-10-CM

## 2023-02-06 DIAGNOSIS — E55.9 VITAMIN D DEFICIENCY: ICD-10-CM

## 2023-02-06 DIAGNOSIS — Z51.81 MEDICATION MONITORING ENCOUNTER: ICD-10-CM

## 2023-02-06 DIAGNOSIS — M81.0 OSTEOPOROSIS, POSTMENOPAUSAL: Primary | ICD-10-CM

## 2023-02-06 DIAGNOSIS — N20.0 KIDNEY STONE: ICD-10-CM

## 2023-02-06 PROCEDURE — 99214 OFFICE O/P EST MOD 30 MIN: CPT | Performed by: INTERNAL MEDICINE

## 2023-02-06 PROCEDURE — 77080 DXA BONE DENSITY AXIAL: CPT

## 2023-02-06 PROCEDURE — 77080 DXA BONE DENSITY AXIAL: CPT | Performed by: INTERNAL MEDICINE

## 2023-02-06 NOTE — RESULT ENCOUNTER NOTE
[facility]) Osteoporosis and [address] [facility] [address] [facility], [address] [place] [address],[place] [address]  Phone [phone]  Fax [phone]    NAME: Philip Weber   : 1965   STUDY DATE: 2023     REFERRING PROVIDER: Chantell Singh MD    INDICATION(S) FOR PERFORMING THE STUDY:  osteoporosis, age related (M81.0)    CLINICAL INFORMATION PROVIDED BY THE PATIENT: 47-year-old woman. Natural menopause age 46. No history of fragility fractures. No long-term corticosteroid use. She took [employer] 2022-2023. Current treatment is Prolia to start 2023. Family history of osteoporosis (mother). EQUIPMENT: [employer]. POSITIONING: Good. REGIONS OF INTEREST: Correct. ARTIFACTS: None. STUDY VALID? Yes. Spine BMD is spuriously high because of generalized degenerative change. No adjustments were made. T-scores   Initial study: 2021 L2+L4 -4.7 left fem. neck -3.5   Current study: 2023 L2+L4 -3.7 right  fem neck -37. The table below shows bone mineral density (grams/cm2), the appropriate measure for comparing serial scans. A significant increase or decrease is based on precision studies done at our center according to the ISCD protocol with a least significant change of 0.030 g/cm2. PA spine Proximal Femur (left)   Date L2+L4  Fem. neck Trochanter Total hip   2021 0.564 0.463 0.426 0.578   2023 0.665 0.508 0.438 0.601      PA spine Proximal Femur (left)   Date L2+L4  Fem. neck Trochanter Total hip   2023 0.665 0.440 0.381 0.537     IMPRESSION:  BONE DENSITY IS LOW, CONSISTENT WITH OSTEOPOROSIS, AND LOWER THAN EXPECTED FOR AGE, RACE AND SEX. SINCE THE LAST DXA, BMD INCREASED IN THE SPINE, FEMORAL NECK AND IS TRENDING UP IN THE TOTAL HIP. Consider repeating this study in 1-2 years to assess the patient's progress. _________________________________________________   Galilea Weiss MD, Director, [facility] ([facility]) Osteoporosis and Bone Health Services

## 2023-02-06 NOTE — PROGRESS NOTES
[facility] ([facility]) Osteoporosis and [address] [facility]  [place] [address] [facility], [address] [facility],[place] [address]  Phone [phone]  Fax [phone]    NAME:  Wiley Bundy  :  1965  CONSULT DATE:    2021  MOST RECENT VISIT:  2022  TODAY'S DATE:  2023    Labs @ [facility] 2022    PROBLEMS. Osteoporosis by DXA 2021, T-scores -4.7 in the spine (L2+L4), -3.5 in the left femoral neck    Family history of osteoporosis, mother  Natural menopause age 46  Vitamin D deficiency; desirable 25-OH D is 30-60 ng/mL    15 ng/mL 2021    75 ng/mL 2022 with vitamin D 97794 IU/d, changed to 2000 IU QOD  Glaucoma;  and continuing, visual blurring lasting for hours - dx uncertain  Kidney stone ~2021 24-h urine VOL 2.8 UCa 202  ox 22 citrate 350*  Caregiver for elderly mother who  2021    CURRENT MANAGEMENT FOR BONE HEALTH/OSTEOPOROSIS. Calcium, 300 mg from low calcium foods,  300 mg yogurt, 300 mg cheese, 200 mg cottage cheese   diet MVI Ca+D other total    Calcium 1100     mg/d   Vitamin D    2000 QOD  IU/d   Exercise, walks dog 15-30 min 2 x daily; would like to start yoga again  Pharmacologic therapy: Prolia 60 mg SQ twice yearly to start today. PREVIOUS BONE-ACTIVE MEDICATIONS. Evenity 210 mg SQ monthly 2022-2023    OTHER CURRENT MEDICATIONS (SELECTED): Osphena  OTC MEDICATIONS (SELECTED): Benefiber    CHIEF COMPLAINT. Here for f/u of osteoporosis, vitamin D deficiency, monitoring treatment. No new related signs or symptoms. INTERVAL HISTORY:  See problem list for chronic/inactive conditions. She received Evenity with tolerable injection site reactions. No falls, near-falls or fractures. Feels well overall. FOR FULL DETAILS OF FAMILY HISTORY, PAST MEDICAL AND SURGICAL HISTORY, SOCIAL HISTORY, AND REVIEW OF SYSTEMS, SEE PATIENT QUESTIONNAIRE OF TODAY'S DATE. PHYSICAL EXAMINATION. GENERAL.   Well-nourished, well-developed, normally proportioned adult. Conjunctival injection. MENTAL STATUS. Pleasant mood. Oriented to time, place, and person. ORAL. Teeth appear to be in good condition. MUSCULOSKELETAL. Spinal contours are normal.  No spine tenderness to palpation or percussion. Three finger spaces between ribs and pelvis. Gait steady without assistance. NEUROLOGICAL. Able to rise from chair without using arms. No apparent motor or sensory deficit. Coordination appears normal     BONE DENSITY. Most recent done here using [employer] equipment.     -scores   Initial study: 05/05/2021 L2+L4 -4.7 left fem. neck -3.5   Current study: 02/06/2023 L2+L4 -3.7 right  fem neck -37. The table below shows bone mineral density (grams/cm2), the appropriate measure for comparing serial scans. A significant increase or decrease is based on precision studies done at our center according to the ISCD protocol with a least significant change of 0.030 g/cm2. PA spine Proximal Femur (left)   Date L2+L4  Fem. neck Trochanter Total hip   05/05/2021 0.564 0.463 0.426 0.578   02/06/2023 0.665 0.508 0.438 0.601      PA spine Proximal Femur (left)   Date L2+L4  Fem. neck Trochanter Total hip   02/06/2023 0.665 0.440 0.381 0.537     IMPRESSION:  BONE DENSITY IS LOW, CONSISTENT WITH OSTEOPOROSIS, AND LOWER THAN EXPECTED FOR AGE, RACE AND SEX. SINCE THE LAST DXA, BMD INCREASED IN THE SPINE, FEMORAL NECK AND IS TRENDING UP IN THE TOTAL HIP. Labs: 06/2019 Ca 9.0 Cr 0.7. 04/2021 Ca 9.1 Cr 0.7. 04/2022 Cr 0.6 Ca 9.4. Imaging: DXA printouts reviewed. ASSESSMENT. Osteoporosis, bone density lower than desirable and lower than expected for ager, race and sex. Without effective treatment, fracture risk is high. She did well with Evenity 02/2022-01/2023    PLANS. OK to give Prolia today and continue treatment with Prolia 60 mg SQ twice yearly. Return appointment with DXA in 1 year. Time spent today: 30-39 minutes. Lynda Hummel MD, Director, [facility]) Osteoporosis and Bone Health Services    CC: Kaden Sainz MD

## 2023-03-29 ENCOUNTER — TELEPHONE (OUTPATIENT)
Dept: ENDOCRINOLOGY | Age: 63
End: 2023-03-29

## 2023-03-29 RX ORDER — DENOSUMAB 60 MG/ML
INJECTION SUBCUTANEOUS
Qty: 1 ML | Refills: 0 | Status: SHIPPED | OUTPATIENT
Start: 2023-03-29

## 2023-03-29 NOTE — TELEPHONE ENCOUNTER
Casie Lemos Key: [id number] - PA Case ID: [id number] STATUS:PENDING    If approved, please send script to [facility]. Thanks!

## 2023-03-29 NOTE — TELEPHONE ENCOUNTER
Emily Quezada Key: [id number] - PA Case ID: [id number]  Outcome  Approved today  PA Case: [id number], Status: Approved, Coverage Starts on: 3/29/2023 12:00:00 AM, Coverage Ends on: 3/28/2024 12:00:00 AM.

## 2023-03-30 ENCOUNTER — TELEPHONE (OUTPATIENT)
Dept: ENDOCRINOLOGY | Age: 63
End: 2023-03-30

## 2023-03-30 NOTE — TELEPHONE ENCOUNTER
An prolia rx was sent to [address] [place] [address]  Please answer the call and release shipment of the prolia to this office

## 2023-04-29 ENCOUNTER — PATIENT MESSAGE (OUTPATIENT)
Dept: ENDOCRINOLOGY | Age: 63
End: 2023-04-29

## 2023-05-01 NOTE — TELEPHONE ENCOUNTER
From: Ирина Saldaña  To: Dr. Bryant Maxim: 4/29/2023 1:53 PM EDT  Subject: [address] [facility]    I have gotten a letter from [address] [facility] dated 4/3/23 stating your office has filed an appeal on my behalf. Can yo tell me what the appeal is for? They are asking me to fill out a form and mail if for the appeal to be processed on my behalf. They are also stating that if the appeal is filed by you I will not be able to file a separate one for this service myself. I am assuming it has something to do with the Prolia injection? But that was already approved and [facility] has already called me and the second injection is scheduled to be delivered on 7/26/23 for my 8/9/23 appointment. Please advise. I will wait for your response before I fill out [place] form. Thanks!

## 2023-07-06 ENCOUNTER — TELEPHONE (OUTPATIENT)
Dept: ENDOCRINOLOGY | Age: 63
End: 2023-07-06

## 2023-08-05 ENCOUNTER — HOSPITAL ENCOUNTER (OUTPATIENT)
Dept: MAMMOGRAPHY | Age: 63
Discharge: HOME | End: 2023-08-05
Payer: COMMERCIAL

## 2023-08-05 VITALS — WEIGHT: 140 LBS | BODY MASS INDEX: 23.32 KG/M2 | HEIGHT: 65 IN

## 2023-08-05 DIAGNOSIS — Z12.31 VISIT FOR SCREENING MAMMOGRAM: ICD-10-CM

## 2023-08-05 PROCEDURE — 77063 BREAST TOMOSYNTHESIS BI: CPT

## 2023-08-09 ENCOUNTER — NURSE ONLY (OUTPATIENT)
Dept: ENDOCRINOLOGY | Age: 63
End: 2023-08-09

## 2023-08-09 DIAGNOSIS — M81.0 OSTEOPOROSIS, POSTMENOPAUSAL: Primary | ICD-10-CM

## 2024-01-02 DIAGNOSIS — M81.0 OSTEOPOROSIS, POSTMENOPAUSAL: Primary | ICD-10-CM

## 2024-01-02 RX ORDER — DENOSUMAB 60 MG/ML
INJECTION SUBCUTANEOUS
Qty: 1 ML | Refills: 0 | Status: SHIPPED | OUTPATIENT
Start: 2024-01-02

## 2024-01-02 NOTE — TELEPHONE ENCOUNTER
Medication:   Requested Prescriptions     Pending Prescriptions Disp Refills    PROLIA 60 MG/ML SOSY SC injection [Pharmacy Med Name: PROLIA PFS 60MG/ML]  0     Sig: TO BE ADMINISTERED IN PHYSICIAN'S OFFICE. INJECT ONE SYRINGE SUBCUTANEOUSLY ONCE EVERY 6 MONTHS. REFRIGERATE. USE WITHIN 14 DAYS ONCE AT ROOM TEMPERATURE.       Last Filled:      Patient Phone Number: [phone] (home)     Last appt: 2/6/2023   Next appt: 2/14/2024    Last Labs DM: No results found for: \"LABA1C\"  Last Lipid: No results found for: \"CHOL\", \"TRIG\", \"HDL\", \"LDLCALC\"  Last PSA: No results found for: \"PSA\"  Last Thyroid: No results found for: \"TSH\", \"FT3\", \"Q5VPAOE\", \"T4FREE\", \"N6MOHDS\"

## 2024-01-15 ENCOUNTER — TELEPHONE (OUTPATIENT)
Dept: ENDOCRINOLOGY | Age: 64
End: 2024-01-15

## 2024-01-15 NOTE — TELEPHONE ENCOUNTER
Patient spoke to [facility] and Prolia will be delivered on 1/23/24.     [facility] told her that the Prolia needs a PA. Patient informed that that there is already a PA for the Prolia and they told her that they did not see a PA approval on the \"major medical\" plan. However prolia is billed through pharmacy. Can you please check and make sure that they have the PA approval information.

## 2024-01-16 NOTE — TELEPHONE ENCOUNTER
[facility] related to [facility]lia.  Nico is scheduled to ship and is through her medical benefits same as last time.

## 2024-02-14 ENCOUNTER — NURSE ONLY (OUTPATIENT)
Dept: ENDOCRINOLOGY | Age: 64
End: 2024-02-14
Payer: COMMERCIAL

## 2024-02-14 DIAGNOSIS — M81.0 OSTEOPOROSIS, POSTMENOPAUSAL: Primary | ICD-10-CM

## 2024-02-14 PROCEDURE — 96372 THER/PROPH/DIAG INJ SC/IM: CPT | Performed by: INTERNAL MEDICINE

## 2024-03-18 ENCOUNTER — HOSPITAL ENCOUNTER (OUTPATIENT)
Dept: GENERAL RADIOLOGY | Age: 64
Discharge: HOME | End: 2024-03-18
Payer: COMMERCIAL

## 2024-03-18 ENCOUNTER — OFFICE VISIT (OUTPATIENT)
Dept: ENDOCRINOLOGY | Age: 64
End: 2024-03-18
Payer: COMMERCIAL

## 2024-03-18 VITALS
DIASTOLIC BLOOD PRESSURE: 82 MMHG | SYSTOLIC BLOOD PRESSURE: 152 MMHG | BODY MASS INDEX: 23.63 KG/M2 | WEIGHT: 138.4 LBS | HEIGHT: 64 IN | HEART RATE: 76 BPM | RESPIRATION RATE: 16 BRPM

## 2024-03-18 DIAGNOSIS — N20.0 KIDNEY STONE: ICD-10-CM

## 2024-03-18 DIAGNOSIS — Z51.81 MEDICATION MONITORING ENCOUNTER: ICD-10-CM

## 2024-03-18 DIAGNOSIS — E55.9 VITAMIN D DEFICIENCY: ICD-10-CM

## 2024-03-18 DIAGNOSIS — M81.0 OSTEOPOROSIS, POSTMENOPAUSAL: Primary | ICD-10-CM

## 2024-03-18 DIAGNOSIS — M81.0 OSTEOPOROSIS, POSTMENOPAUSAL: ICD-10-CM

## 2024-03-18 PROCEDURE — 1123F ACP DISCUSS/DSCN MKR DOCD: CPT | Performed by: INTERNAL MEDICINE

## 2024-03-18 PROCEDURE — 99214 OFFICE O/P EST MOD 30 MIN: CPT | Performed by: INTERNAL MEDICINE

## 2024-03-18 PROCEDURE — 77080 DXA BONE DENSITY AXIAL: CPT | Performed by: INTERNAL MEDICINE

## 2024-03-18 PROCEDURE — 77080 DXA BONE DENSITY AXIAL: CPT

## 2024-03-18 NOTE — PROGRESS NOTES
[facility] Osteoporosis and Bone Health Services  [address] [place] [address] [address] [place] [address]  Phone [phone]  Fax [phone]    NAME:  CHATA WICK  :  1965  CONSULT DATE:    2021  MOST RECENT VISIT:  2023  TODAY'S DATE:  2024     Labs @ [facility] 2023    PROBLEMS.  Osteoporosis by DXA 2021, T-scores -4.7 in the spine (L2+L4), -3.5 in the left femoral neck    Family history of osteoporosis, mother  Natural menopause age 51  Vitamin D deficiency; desirable 25-OH D is 30-60 ng/mL    15 ng/mL 2021    75 ng/mL 2022 with vitamin D 75530 IU/d, changed to 2000 IU QOD  41 ng/mL   Glaucoma;  and continuing, visual blurring lasting for hours - dx uncertain  Kidney stone ~2021 24-h urine VOL 2.8 UCa 202  ox 22 citrate 350*  Caregiver for elderly mother who  2021    CURRENT MANAGEMENT FOR BONE HEALTH/OSTEOPOROSIS.  Calcium, 300 mg from low calcium foods,  300 mg yogurt, 300 mg cheese, 200 mg cottage cheese   diet MVI Ca+D other total    Calcium 1100     mg/d   Vitamin D    2000 QOD  IU/d   Exercise, walks dog 15-30 min 2 x daily; would like to start yoga again  Pharmacologic therapy: Prolia 60 mg SQ twice yearly started 2023.    PREVIOUS BONE-ACTIVE MEDICATIONS.   Evenity 210 mg SQ monthly 2022-2023    OTHER CURRENT MEDICATIONS (SELECTED): Osphena  OTC MEDICATIONS (SELECTED): Benefiber    CHIEF COMPLAINT.  Here for f/u of osteoporosis, vitamin D deficiency, monitoring treatment.  No new related signs or symptoms.     INTERVAL HISTORY:  See problem list for chronic/inactive conditions.  She received Prolia without side effects. No falls, near-falls or fractures. Feels well overall.    FOR FULL DETAILS OF FAMILY HISTORY, PAST MEDICAL AND SURGICAL HISTORY, SOCIAL HISTORY, AND REVIEW OF SYSTEMS, SEE PATIENT QUESTIONNAIRE OF TODAY'S DATE.    PHYSICAL EXAMINATION.   GENERAL.  Well-nourished, well-developed, normally proportioned

## 2024-07-15 ENCOUNTER — TELEPHONE (OUTPATIENT)
Dept: ENDOCRINOLOGY | Age: 64
End: 2024-07-15

## 2024-07-15 DIAGNOSIS — M81.0 OSTEOPOROSIS, POSTMENOPAUSAL: Primary | ICD-10-CM

## 2024-07-15 DIAGNOSIS — M81.0 OSTEOPOROSIS, POSTMENOPAUSAL: ICD-10-CM

## 2024-07-15 RX ORDER — DENOSUMAB 60 MG/ML
60 INJECTION SUBCUTANEOUS ONCE
Qty: 1 ML | Refills: 0 | Status: CANCELLED | OUTPATIENT
Start: 2024-07-15 | End: 2024-07-15

## 2024-07-15 NOTE — TELEPHONE ENCOUNTER
[facility]       PROLIA 60 MG/ML SOSY SC injection     [facility] [place] - [address] [place] [phone] - F [phone]  [address] [address]  Phone: [phone]  Fax: [phone]

## 2024-07-16 RX ORDER — DENOSUMAB 60 MG/ML
INJECTION SUBCUTANEOUS
Qty: 1 EACH | Refills: 0 | Status: SHIPPED | OUTPATIENT
Start: 2024-07-16

## 2024-07-17 ENCOUNTER — TELEPHONE (OUTPATIENT)
Dept: ENDOCRINOLOGY | Age: 64
End: 2024-07-17

## 2024-07-17 NOTE — TELEPHONE ENCOUNTER
Pt is call back stating that she gave [place] the wrong insurance    Pt stated that she has [employer] medicare supplement     Member ID# [id number]  Group# [id number]  Plan [id number]   Plan code [id number]

## 2024-07-17 NOTE — TELEPHONE ENCOUNTER
Pt's insurance anthem is correct but will be changing 8/1/24 to medicare part A and B part A is in effect now part B goes in effect on 8/1/24        Medicare part A and B   [id number]

## 2024-07-17 NOTE — TELEPHONE ENCOUNTER
Spoke with patient regarding BCBS PA denial. Even though insurance is good through 7/31/24. Appointment for Prolia is 9/6/24 which is when the injection will get billed to [facility]. Patient aware that she wont be able to use the Co-pay Assist Card with [employer].     Well Care through [facility] will take effect 8/1/24. Will submit Prolia to [employer] then for SOB. Patient aware. ID [id number], Group [id number], Plan [id number]

## 2024-07-17 NOTE — TELEPHONE ENCOUNTER
Submitted PA for PROLIA  [employer]ia [employer] Key: [id number] STATUS: NOT SENT    This request cannot be processed due to the member's coverage has terminated. Resubmit using active member ID information.     Follow up done daily; if no decision with in three days we will refax.  If another three days goes by with no decision will call the insurance for status.

## 2024-08-02 ENCOUNTER — TELEPHONE (OUTPATIENT)
Dept: ENDOCRINOLOGY | Age: 64
End: 2024-08-02

## 2024-08-06 ENCOUNTER — TELEPHONE (OUTPATIENT)
Dept: ENDOCRINOLOGY | Age: 64
End: 2024-08-06

## 2024-08-06 ENCOUNTER — HOSPITAL ENCOUNTER (OUTPATIENT)
Dept: MAMMOGRAPHY | Age: 64
Discharge: HOME | End: 2024-08-06
Attending: OBSTETRICS & GYNECOLOGY
Payer: COMMERCIAL

## 2024-08-06 VITALS — BODY MASS INDEX: 24.66 KG/M2 | HEIGHT: 65 IN | WEIGHT: 148 LBS

## 2024-08-06 DIAGNOSIS — Z12.31 VISIT FOR SCREENING MAMMOGRAM: ICD-10-CM

## 2024-08-06 PROCEDURE — 77063 BREAST TOMOSYNTHESIS BI: CPT

## 2024-09-06 ENCOUNTER — NURSE ONLY (OUTPATIENT)
Dept: ENDOCRINOLOGY | Age: 64
End: 2024-09-06
Payer: MEDICARE

## 2024-09-06 DIAGNOSIS — M81.0 OSTEOPOROSIS, POSTMENOPAUSAL: Primary | ICD-10-CM

## 2024-09-06 PROCEDURE — 96372 THER/PROPH/DIAG INJ SC/IM: CPT | Performed by: INTERNAL MEDICINE

## 2025-02-16 DIAGNOSIS — M81.0 OSTEOPOROSIS, POSTMENOPAUSAL: Primary | ICD-10-CM

## 2025-03-06 ENCOUNTER — TELEPHONE (OUTPATIENT)
Dept: ENDOCRINOLOGY | Age: 65
End: 2025-03-06

## 2025-03-19 ENCOUNTER — OFFICE VISIT (OUTPATIENT)
Dept: ENDOCRINOLOGY | Age: 65
End: 2025-03-19
Payer: MEDICARE

## 2025-03-19 ENCOUNTER — HOSPITAL ENCOUNTER (OUTPATIENT)
Dept: GENERAL RADIOLOGY | Age: 65
Discharge: HOME | End: 2025-03-19
Payer: MEDICARE

## 2025-03-19 VITALS — HEIGHT: 64 IN | WEIGHT: 149 LBS | BODY MASS INDEX: 25.44 KG/M2

## 2025-03-19 DIAGNOSIS — Z51.81 MEDICATION MONITORING ENCOUNTER: ICD-10-CM

## 2025-03-19 DIAGNOSIS — M81.0 OSTEOPOROSIS, POSTMENOPAUSAL: ICD-10-CM

## 2025-03-19 DIAGNOSIS — M81.0 OSTEOPOROSIS, POSTMENOPAUSAL: Primary | ICD-10-CM

## 2025-03-19 DIAGNOSIS — N20.0 KIDNEY STONE: ICD-10-CM

## 2025-03-19 DIAGNOSIS — E55.9 VITAMIN D DEFICIENCY: ICD-10-CM

## 2025-03-19 PROCEDURE — 3017F COLORECTAL CA SCREEN DOC REV: CPT | Performed by: INTERNAL MEDICINE

## 2025-03-19 PROCEDURE — G8427 DOCREV CUR MEDS BY ELIG CLIN: HCPCS | Performed by: INTERNAL MEDICINE

## 2025-03-19 PROCEDURE — 77080 DXA BONE DENSITY AXIAL: CPT

## 2025-03-19 PROCEDURE — 1123F ACP DISCUSS/DSCN MKR DOCD: CPT | Performed by: INTERNAL MEDICINE

## 2025-03-19 PROCEDURE — 99214 OFFICE O/P EST MOD 30 MIN: CPT | Performed by: INTERNAL MEDICINE

## 2025-03-19 PROCEDURE — 96372 THER/PROPH/DIAG INJ SC/IM: CPT | Performed by: INTERNAL MEDICINE

## 2025-03-19 PROCEDURE — 1159F MED LIST DOCD IN RCRD: CPT | Performed by: INTERNAL MEDICINE

## 2025-03-19 PROCEDURE — G8419 CALC BMI OUT NRM PARAM NOF/U: HCPCS | Performed by: INTERNAL MEDICINE

## 2025-03-19 PROCEDURE — 1090F PRES/ABSN URINE INCON ASSESS: CPT | Performed by: INTERNAL MEDICINE

## 2025-03-19 PROCEDURE — 1036F TOBACCO NON-USER: CPT | Performed by: INTERNAL MEDICINE

## 2025-03-19 PROCEDURE — G8399 PT W/DXA RESULTS DOCUMENT: HCPCS | Performed by: INTERNAL MEDICINE

## 2025-03-19 NOTE — PROGRESS NOTES
Patient presents for a prolia injection. Pt denies any prior adverse reactions. Prolia 60 mg given SubQ in the right arm with no complications. Patient tolerated well. Patient to return to office in 6 months for next injection.     
treatment, fracture risk is high. She did well with Evenity 02/2022-01/2023 and continues to do well with Prolia started 02/2023.    PLANS.  OK to give Prolia today and continue Prolia 60 mg SQ twice yearly.   Return with DXA in 1 year.  Time today: 30-39 minutes.       Dax Weiss MD, Director, [facility] Osteoporosis and Bone Health Services    CC: Michelle Barker MD

## 2025-05-08 NOTE — PROGRESS NOTES
[facility] ([facility]) Osteoporosis and [address] [facility] [address] [facility], [address] [facility]  Phone [phone]  Fax [phone]    NAME:  Chace Hathaway  :  1965  CONSULT DATE:    2021  MOST RECENT VISIT:  2022  TODAYS DATE:  2022    Labs @ [facility] 2021    PROBLEMS. Osteoporosis by DXA 2021, T-scores -4.7 in the spine (L2+L4), -3.5 in the left femoral neck    Family history of osteoporosis, mother  Natural menopause age 46  Vitamin D deficiency; desirable 25-OH D is 30-60 ng/mL    15 ng/mL 2021  Glaucoma;  and continuing, visual blurring lasting for hours - dx uncertain  Kidney stone ~2021 24-h urine VOL 2.8 UCa 202  ox 22 citrate 350*  Caregiver for elderly mother who  2021    CURRENT MANAGEMENT FOR BONE HEALTH/OSTEOPOROSIS. Calcium, 300 mg from low calcium foods,  300 mg yogurt, 300 mg cheese, 200 mg cottage cheese   diet MVI Ca+D other total    Calcium 1100  600   mg/d   Vitamin D   400 4000  IU/d   Exercise, walks dog 15-30 min 2 x daily; would like to start yoga again  Pharmacologic therapy: Evenity 210 mg SQ monthly started 2022. PREVIOUS BONE-ACTIVE MEDICATIONS. none    OTHER CURRENT MEDICATIONS (SELECTED): Osphena  OTC MEDICATIONS (SELECTED): Benefiber    CHIEF COMPLAINT. Here for f/u of osteoporosis, vitamin D deficiency, monitoring treatment. No new related signs or symptoms. INTERVAL HISTORY:  See problem list for chronic/inactive conditions. She received Evenity with tolerable injection site reactions. No falls, near-falls or fractures. Feels well overall. FOR FULL DETAILS OF FAMILY HISTORY, PAST MEDICAL AND SURGICAL HISTORY, SOCIAL HISTORY, AND REVIEW OF SYSTEMS, SEE PATIENT QUESTIONNAIRE OF TODAYS DATE. PHYSICAL EXAMINATION. GENERAL. Well-nourished, well-developed, normally proportioned adult. Conjunctival injection. MENTAL STATUS. Pleasant mood.   Oriented to time, place, and
Statement Selected

## 2025-08-07 ENCOUNTER — HOSPITAL ENCOUNTER (OUTPATIENT)
Dept: MAMMOGRAPHY | Age: 65
Discharge: HOME | End: 2025-08-07
Payer: MEDICARE

## 2025-08-07 VITALS — WEIGHT: 149 LBS | HEIGHT: 64 IN | BODY MASS INDEX: 25.44 KG/M2

## 2025-08-07 DIAGNOSIS — Z12.31 VISIT FOR SCREENING MAMMOGRAM: ICD-10-CM

## 2025-08-07 PROCEDURE — 77067 SCR MAMMO BI INCL CAD: CPT

## 2025-08-14 ENCOUNTER — HOSPITAL ENCOUNTER (OUTPATIENT)
Dept: ULTRASOUND IMAGING | Age: 65
Discharge: HOME | End: 2025-08-14
Payer: MEDICARE

## 2025-08-14 ENCOUNTER — HOSPITAL ENCOUNTER (OUTPATIENT)
Dept: MAMMOGRAPHY | Age: 65
Discharge: HOME | End: 2025-08-14
Payer: MEDICARE

## 2025-08-14 DIAGNOSIS — R92.8 ABNORMAL MAMMOGRAM: ICD-10-CM

## 2025-08-14 PROCEDURE — G0279 TOMOSYNTHESIS, MAMMO: HCPCS

## 2025-08-14 PROCEDURE — 76642 ULTRASOUND BREAST LIMITED: CPT

## 2025-08-25 ENCOUNTER — HOSPITAL ENCOUNTER (OUTPATIENT)
Dept: ULTRASOUND IMAGING | Age: 65
Discharge: HOME | End: 2025-08-25
Payer: MEDICARE

## 2025-08-25 ENCOUNTER — HOSPITAL ENCOUNTER (OUTPATIENT)
Dept: MAMMOGRAPHY | Age: 65
Discharge: HOME | End: 2025-08-25
Payer: MEDICARE

## 2025-08-25 DIAGNOSIS — N63.0 BREAST MASS IN FEMALE: ICD-10-CM

## 2025-08-25 DIAGNOSIS — R92.8 ABNORMAL MAMMOGRAM: ICD-10-CM

## 2025-08-25 PROCEDURE — 2709999900 US BREAST BIOPSY W LOC DEVICE 1ST LESION RIGHT

## 2025-08-25 PROCEDURE — 77065 DX MAMMO INCL CAD UNI: CPT

## 2025-08-25 PROCEDURE — 88305 TISSUE EXAM BY PATHOLOGIST: CPT

## 2025-08-27 ENCOUNTER — TELEPHONE (OUTPATIENT)
Dept: ENDOCRINOLOGY | Age: 65
End: 2025-08-27